# Patient Record
Sex: MALE | Race: WHITE | Employment: UNEMPLOYED | URBAN - NONMETROPOLITAN AREA
[De-identification: names, ages, dates, MRNs, and addresses within clinical notes are randomized per-mention and may not be internally consistent; named-entity substitution may affect disease eponyms.]

---

## 2017-11-21 ENCOUNTER — HOSPITAL ENCOUNTER (OUTPATIENT)
Dept: LAB | Age: 57
Discharge: HOME | End: 2017-11-21
Attending: PSYCHIATRY & NEUROLOGY | Admitting: PSYCHIATRY & NEUROLOGY

## 2017-11-21 LAB
ALBUMIN SERPL-MCNC: 4.3 GM/DL (ref 3.4–5)
ALP BLD-CCNC: 61 IU/L (ref 40–129)
ALT SERPL-CCNC: 13 U/L (ref 10–40)
ANION GAP SERPL CALCULATED.3IONS-SCNC: 15 MMOL/L (ref 4–16)
AST SERPL-CCNC: 13 IU/L (ref 15–37)
BILIRUB SERPL-MCNC: 0.3 MG/DL (ref 0–1)
BUN BLDV-MCNC: 8 MG/DL (ref 6–23)
CALCIUM SERPL-MCNC: 9.2 MG/DL (ref 8.3–10.6)
CHLORIDE BLD-SCNC: 103 MMOL/L (ref 99–110)
CHOLESTEROL, FASTING: 249 MG/DL
CO2: 26 MMOL/L (ref 21–32)
CREAT SERPL-MCNC: 0.6 MG/DL (ref 0.9–1.3)
GFR AFRICAN AMERICAN: >60 ML/MIN/1.73M2
GFR NON-AFRICAN AMERICAN: >60 ML/MIN/1.73M2
GLUCOSE FASTING: 109 MG/DL (ref 70–99)
HCT VFR BLD CALC: 43.8 % (ref 42–52)
HDLC SERPL-MCNC: 65 MG/DL
HEMOGLOBIN: 14.8 GM/DL (ref 13.5–18)
LDL CHOLESTEROL DIRECT: 169 MG/DL
MCH RBC QN AUTO: 32.4 PG (ref 27–31)
MCHC RBC AUTO-ENTMCNC: 33.8 % (ref 32–36)
MCV RBC AUTO: 95.8 FL (ref 78–100)
PDW BLD-RTO: 11.9 % (ref 11.7–14.9)
PLATELET # BLD: 227 K/CU MM (ref 140–440)
PMV BLD AUTO: 10.1 FL (ref 7.5–11.1)
POTASSIUM SERPL-SCNC: 4.3 MMOL/L (ref 3.5–5.1)
RBC # BLD: 4.57 M/CU MM (ref 4.6–6.2)
SODIUM BLD-SCNC: 144 MMOL/L (ref 135–145)
TOTAL PROTEIN: 6.9 GM/DL (ref 6.4–8.2)
TRIGLYCERIDE, FASTING: 102 MG/DL
WBC # BLD: 8.7 K/CU MM (ref 4–10.5)

## 2019-03-18 ENCOUNTER — HOSPITAL ENCOUNTER (INPATIENT)
Age: 59
LOS: 2 days | Discharge: HOME | End: 2019-03-21
Attending: EMERGENCY MEDICINE | Admitting: PSYCHIATRY & NEUROLOGY
Payer: MEDICAID

## 2019-03-18 DIAGNOSIS — F10.10 ALCOHOL ABUSE: ICD-10-CM

## 2019-03-18 DIAGNOSIS — F30.10 MANIC BEHAVIOR (HCC): ICD-10-CM

## 2019-03-18 DIAGNOSIS — R45.851 SUICIDAL IDEATION: Primary | ICD-10-CM

## 2019-03-18 LAB
ACETAMINOPHEN LEVEL: <5 UG/ML (ref 15–30)
ALBUMIN SERPL-MCNC: 4.4 GM/DL (ref 3.4–5)
ALCOHOL SCREEN SERUM: 0.1 %WT/VOL
ALP BLD-CCNC: 63 IU/L (ref 40–129)
ALT SERPL-CCNC: 53 U/L (ref 10–40)
AMPHETAMINES: NEGATIVE
ANION GAP SERPL CALCULATED.3IONS-SCNC: 24 MMOL/L (ref 4–16)
AST SERPL-CCNC: 89 IU/L (ref 15–37)
BACTERIA: ABNORMAL /HPF
BARBITURATE SCREEN URINE: NEGATIVE
BASOPHILS ABSOLUTE: 0 K/CU MM
BASOPHILS RELATIVE PERCENT: 0.4 % (ref 0–1)
BENZODIAZEPINE SCREEN, URINE: NEGATIVE
BILIRUB SERPL-MCNC: 0.5 MG/DL (ref 0–1)
BILIRUBIN URINE: NEGATIVE MG/DL
BLOOD, URINE: NEGATIVE
BUN BLDV-MCNC: 5 MG/DL (ref 6–23)
CALCIUM SERPL-MCNC: 9.3 MG/DL (ref 8.3–10.6)
CANNABINOID SCREEN URINE: ABNORMAL
CAST TYPE: ABNORMAL /HPF
CHLORIDE BLD-SCNC: 99 MMOL/L (ref 99–110)
CLARITY: CLEAR
CO2: 20 MMOL/L (ref 21–32)
COCAINE METABOLITE: NEGATIVE
COLOR: YELLOW
CREAT SERPL-MCNC: 0.4 MG/DL (ref 0.9–1.3)
CRYSTAL TYPE: ABNORMAL /HPF
DIFFERENTIAL TYPE: ABNORMAL
DOSE AMOUNT: ABNORMAL
DOSE AMOUNT: ABNORMAL
DOSE TIME: ABNORMAL
DOSE TIME: ABNORMAL
EOSINOPHILS ABSOLUTE: 0.2 K/CU MM
EOSINOPHILS RELATIVE PERCENT: 1.3 % (ref 0–3)
EPITHELIAL CELLS, UA: ABNORMAL /HPF
GFR AFRICAN AMERICAN: >60 ML/MIN/1.73M2
GFR NON-AFRICAN AMERICAN: >60 ML/MIN/1.73M2
GLUCOSE BLD-MCNC: 82 MG/DL (ref 70–99)
GLUCOSE, URINE: NEGATIVE MG/DL
HCT VFR BLD CALC: 42.6 % (ref 42–52)
HEMOGLOBIN: 14.8 GM/DL (ref 13.5–18)
IMMATURE NEUTROPHIL %: 0.2 % (ref 0–0.43)
KETONES, URINE: NEGATIVE MG/DL
LEUKOCYTE ESTERASE, URINE: NEGATIVE
LYMPHOCYTES ABSOLUTE: 2.9 K/CU MM
LYMPHOCYTES RELATIVE PERCENT: 25.5 % (ref 24–44)
MCH RBC QN AUTO: 33.3 PG (ref 27–31)
MCHC RBC AUTO-ENTMCNC: 34.7 % (ref 32–36)
MCV RBC AUTO: 95.7 FL (ref 78–100)
MONOCYTES ABSOLUTE: 0.9 K/CU MM
MONOCYTES RELATIVE PERCENT: 7.9 % (ref 0–4)
MUCUS: NEGATIVE HPF
NITRITE URINE, QUANTITATIVE: NEGATIVE
OPIATES, URINE: NEGATIVE
OXYCODONE: ABNORMAL
PDW BLD-RTO: 12.4 % (ref 11.7–14.9)
PH, URINE: 7 (ref 5–8)
PHENCYCLIDINE, URINE: NEGATIVE
PLATELET # BLD: 224 K/CU MM (ref 140–440)
PMV BLD AUTO: 10.2 FL (ref 7.5–11.1)
POTASSIUM SERPL-SCNC: 3.7 MMOL/L (ref 3.5–5.1)
PROTEIN UA: NEGATIVE MG/DL
RBC # BLD: 4.45 M/CU MM (ref 4.6–6.2)
RBC URINE: ABNORMAL /HPF (ref 0–3)
SALICYLATE LEVEL: <0.3 MG/DL (ref 15–30)
SEGMENTED NEUTROPHILS ABSOLUTE COUNT: 7.3 K/CU MM
SEGMENTED NEUTROPHILS RELATIVE PERCENT: 64.7 % (ref 36–66)
SODIUM BLD-SCNC: 143 MMOL/L (ref 135–145)
SPECIFIC GRAVITY UA: 1 (ref 1–1.03)
TOTAL IMMATURE NEUTOROPHIL: 0.02 K/CU MM
TOTAL PROTEIN: 6.9 GM/DL (ref 6.4–8.2)
TSH HIGH SENSITIVITY: 1.58 UIU/ML (ref 0.27–4.2)
UROBILINOGEN, URINE: 0.2 MG/DL (ref 0.2–1)
WBC # BLD: 11.3 K/CU MM (ref 4–10.5)
WBC UA: ABNORMAL /HPF (ref 0–2)

## 2019-03-18 PROCEDURE — 81001 URINALYSIS AUTO W/SCOPE: CPT

## 2019-03-18 PROCEDURE — 80053 COMPREHEN METABOLIC PANEL: CPT

## 2019-03-18 PROCEDURE — G0480 DRUG TEST DEF 1-7 CLASSES: HCPCS

## 2019-03-18 PROCEDURE — 85025 COMPLETE CBC W/AUTO DIFF WBC: CPT

## 2019-03-18 PROCEDURE — 84443 ASSAY THYROID STIM HORMONE: CPT

## 2019-03-18 PROCEDURE — 99285 EMERGENCY DEPT VISIT HI MDM: CPT

## 2019-03-18 PROCEDURE — 80307 DRUG TEST PRSMV CHEM ANLYZR: CPT

## 2019-03-18 RX ORDER — 0.9 % SODIUM CHLORIDE 0.9 %
1000 INTRAVENOUS SOLUTION INTRAVENOUS ONCE
Status: DISCONTINUED | OUTPATIENT
Start: 2019-03-18 | End: 2019-03-18

## 2019-03-18 ASSESSMENT — PAIN DESCRIPTION - LOCATION: LOCATION: ANKLE

## 2019-03-18 ASSESSMENT — PAIN SCALES - GENERAL: PAINLEVEL_OUTOF10: 10

## 2019-03-19 PROBLEM — F31.2 SEVERE MANIC BIPOLAR I DISORDER WITH PSYCHOTIC FEATURES (HCC): Status: ACTIVE | Noted: 2019-03-19

## 2019-03-19 LAB — ALCOHOL SCREEN SERUM: NORMAL %WT/VOL

## 2019-03-19 PROCEDURE — 1240000000 HC EMOTIONAL WELLNESS R&B

## 2019-03-19 PROCEDURE — 6370000000 HC RX 637 (ALT 250 FOR IP): Performed by: EMERGENCY MEDICINE

## 2019-03-19 PROCEDURE — 6370000000 HC RX 637 (ALT 250 FOR IP): Performed by: PSYCHIATRY & NEUROLOGY

## 2019-03-19 PROCEDURE — G0480 DRUG TEST DEF 1-7 CLASSES: HCPCS

## 2019-03-19 PROCEDURE — G0378 HOSPITAL OBSERVATION PER HR: HCPCS

## 2019-03-19 PROCEDURE — 6370000000 HC RX 637 (ALT 250 FOR IP)

## 2019-03-19 RX ORDER — LORAZEPAM 1 MG/1
2 TABLET ORAL 3 TIMES DAILY
Status: COMPLETED | OUTPATIENT
Start: 2019-03-19 | End: 2019-03-19

## 2019-03-19 RX ORDER — LORAZEPAM 1 MG/1
1 TABLET ORAL EVERY 4 HOURS PRN
Status: DISCONTINUED | OUTPATIENT
Start: 2019-03-19 | End: 2019-03-21 | Stop reason: HOSPADM

## 2019-03-19 RX ORDER — OMEPRAZOLE 20 MG/1
20 CAPSULE, DELAYED RELEASE ORAL DAILY
Status: ON HOLD | COMMUNITY
End: 2019-03-21 | Stop reason: HOSPADM

## 2019-03-19 RX ORDER — NICOTINE 21 MG/24HR
1 PATCH, TRANSDERMAL 24 HOURS TRANSDERMAL DAILY
Status: DISCONTINUED | OUTPATIENT
Start: 2019-03-19 | End: 2019-03-20

## 2019-03-19 RX ORDER — THIAMINE MONONITRATE (VIT B1) 100 MG
100 TABLET ORAL DAILY
Status: DISCONTINUED | OUTPATIENT
Start: 2019-03-19 | End: 2019-03-21 | Stop reason: HOSPADM

## 2019-03-19 RX ORDER — LORAZEPAM 2 MG/ML
4 INJECTION INTRAMUSCULAR
Status: DISCONTINUED | OUTPATIENT
Start: 2019-03-19 | End: 2019-03-21 | Stop reason: HOSPADM

## 2019-03-19 RX ORDER — LORAZEPAM 1 MG/1
1 TABLET ORAL 3 TIMES DAILY
Status: ACTIVE | OUTPATIENT
Start: 2019-03-20 | End: 2019-03-21

## 2019-03-19 RX ORDER — FOLIC ACID 1 MG/1
1 TABLET ORAL DAILY
Status: DISCONTINUED | OUTPATIENT
Start: 2019-03-19 | End: 2019-03-21 | Stop reason: HOSPADM

## 2019-03-19 RX ORDER — LORAZEPAM 1 MG/1
3 TABLET ORAL
Status: DISCONTINUED | OUTPATIENT
Start: 2019-03-19 | End: 2019-03-21 | Stop reason: HOSPADM

## 2019-03-19 RX ORDER — LORAZEPAM 2 MG/ML
2 INJECTION INTRAMUSCULAR
Status: DISCONTINUED | OUTPATIENT
Start: 2019-03-19 | End: 2019-03-21 | Stop reason: HOSPADM

## 2019-03-19 RX ORDER — LORAZEPAM 2 MG/ML
3 INJECTION INTRAMUSCULAR
Status: DISCONTINUED | OUTPATIENT
Start: 2019-03-19 | End: 2019-03-21 | Stop reason: HOSPADM

## 2019-03-19 RX ORDER — DIVALPROEX SODIUM 500 MG/1
1000 TABLET, EXTENDED RELEASE ORAL NIGHTLY
Status: DISCONTINUED | OUTPATIENT
Start: 2019-03-19 | End: 2019-03-21 | Stop reason: HOSPADM

## 2019-03-19 RX ORDER — ONDANSETRON 2 MG/ML
4 INJECTION INTRAMUSCULAR; INTRAVENOUS EVERY 6 HOURS PRN
Status: DISCONTINUED | OUTPATIENT
Start: 2019-03-19 | End: 2019-03-21 | Stop reason: HOSPADM

## 2019-03-19 RX ORDER — LORAZEPAM 1 MG/1
4 TABLET ORAL
Status: DISCONTINUED | OUTPATIENT
Start: 2019-03-19 | End: 2019-03-21 | Stop reason: HOSPADM

## 2019-03-19 RX ORDER — TRAZODONE HYDROCHLORIDE 50 MG/1
50 TABLET ORAL NIGHTLY PRN
Status: DISCONTINUED | OUTPATIENT
Start: 2019-03-19 | End: 2019-03-21 | Stop reason: HOSPADM

## 2019-03-19 RX ORDER — LORAZEPAM 0.5 MG/1
0.5 TABLET ORAL 2 TIMES DAILY
Status: DISCONTINUED | OUTPATIENT
Start: 2019-03-22 | End: 2019-03-21 | Stop reason: HOSPADM

## 2019-03-19 RX ORDER — SODIUM CHLORIDE FOR INHALATION 0.9 %
VIAL, NEBULIZER (ML) INHALATION
Status: COMPLETED
Start: 2019-03-19 | End: 2019-03-19

## 2019-03-19 RX ORDER — LORAZEPAM 1 MG/1
1 TABLET ORAL
Status: DISCONTINUED | OUTPATIENT
Start: 2019-03-19 | End: 2019-03-21 | Stop reason: HOSPADM

## 2019-03-19 RX ORDER — LORAZEPAM 0.5 MG/1
0.5 TABLET ORAL 2 TIMES DAILY
Status: DISCONTINUED | OUTPATIENT
Start: 2019-03-21 | End: 2019-03-19 | Stop reason: DRUGHIGH

## 2019-03-19 RX ORDER — RISPERIDONE 2 MG/1
2 TABLET, FILM COATED ORAL NIGHTLY
Status: DISCONTINUED | OUTPATIENT
Start: 2019-03-19 | End: 2019-03-21 | Stop reason: HOSPADM

## 2019-03-19 RX ORDER — LORAZEPAM 1 MG/1
2 TABLET ORAL
Status: DISCONTINUED | OUTPATIENT
Start: 2019-03-19 | End: 2019-03-21 | Stop reason: HOSPADM

## 2019-03-19 RX ORDER — ZIPRASIDONE HYDROCHLORIDE 20 MG/1
20 CAPSULE ORAL ONCE
Status: COMPLETED | OUTPATIENT
Start: 2019-03-19 | End: 2019-03-19

## 2019-03-19 RX ORDER — FAMOTIDINE 20 MG/1
20 TABLET, FILM COATED ORAL 2 TIMES DAILY
Status: DISCONTINUED | OUTPATIENT
Start: 2019-03-19 | End: 2019-03-21 | Stop reason: HOSPADM

## 2019-03-19 RX ORDER — CALCIUM CARBONATE 200(500)MG
500 TABLET,CHEWABLE ORAL ONCE
Status: COMPLETED | OUTPATIENT
Start: 2019-03-19 | End: 2019-03-19

## 2019-03-19 RX ORDER — LORAZEPAM 2 MG/ML
1 INJECTION INTRAMUSCULAR
Status: DISCONTINUED | OUTPATIENT
Start: 2019-03-19 | End: 2019-03-21 | Stop reason: HOSPADM

## 2019-03-19 RX ORDER — ACETAMINOPHEN 325 MG/1
650 TABLET ORAL EVERY 4 HOURS PRN
Status: DISCONTINUED | OUTPATIENT
Start: 2019-03-19 | End: 2019-03-21 | Stop reason: HOSPADM

## 2019-03-19 RX ORDER — NICOTINE 21 MG/24HR
1 PATCH, TRANSDERMAL 24 HOURS TRANSDERMAL DAILY
Status: DISCONTINUED | OUTPATIENT
Start: 2019-03-19 | End: 2019-03-19

## 2019-03-19 RX ORDER — HALOPERIDOL 5 MG/ML
5 INJECTION INTRAMUSCULAR EVERY 4 HOURS PRN
Status: DISCONTINUED | OUTPATIENT
Start: 2019-03-19 | End: 2019-03-21 | Stop reason: HOSPADM

## 2019-03-19 RX ORDER — LORAZEPAM 0.5 MG/1
0.5 TABLET ORAL 3 TIMES DAILY
Status: DISCONTINUED | OUTPATIENT
Start: 2019-03-21 | End: 2019-03-21 | Stop reason: HOSPADM

## 2019-03-19 RX ORDER — NICOTINE 21 MG/24HR
1 PATCH, TRANSDERMAL 24 HOURS TRANSDERMAL DAILY
Status: DISCONTINUED | OUTPATIENT
Start: 2019-03-19 | End: 2019-03-21 | Stop reason: HOSPADM

## 2019-03-19 RX ADMIN — FOLIC ACID 1 MG: 1 TABLET ORAL at 15:25

## 2019-03-19 RX ADMIN — RISPERIDONE 2 MG: 2 TABLET ORAL at 21:47

## 2019-03-19 RX ADMIN — CALCIUM CARBONATE 500 MG: 500 TABLET, CHEWABLE ORAL at 02:29

## 2019-03-19 RX ADMIN — Medication 100 MG: at 15:25

## 2019-03-19 RX ADMIN — ZIPRASIDONE HYDROCHLORIDE 20 MG: 20 CAPSULE ORAL at 02:29

## 2019-03-19 RX ADMIN — DIVALPROEX SODIUM 1000 MG: 500 TABLET, EXTENDED RELEASE ORAL at 21:47

## 2019-03-19 RX ADMIN — Medication: at 16:29

## 2019-03-19 RX ADMIN — FAMOTIDINE 20 MG: 20 TABLET, FILM COATED ORAL at 21:47

## 2019-03-19 RX ADMIN — LORAZEPAM 2 MG: 1 TABLET ORAL at 21:47

## 2019-03-19 RX ADMIN — LORAZEPAM 2 MG: 1 TABLET ORAL at 14:22

## 2019-03-19 ASSESSMENT — SLEEP AND FATIGUE QUESTIONNAIRES
DO YOU HAVE DIFFICULTY SLEEPING: NO
DO YOU USE A SLEEP AID: NO

## 2019-03-19 ASSESSMENT — LIFESTYLE VARIABLES: HISTORY_ALCOHOL_USE: YES

## 2019-03-19 ASSESSMENT — PAIN SCALES - GENERAL: PAINLEVEL_OUTOF10: 0

## 2019-03-20 PROBLEM — R74.01 TRANSAMINITIS: Status: ACTIVE | Noted: 2019-03-20

## 2019-03-20 PROBLEM — F10.288 ALCOHOL DEPENDENCE WITH OTHER ALCOHOL-INDUCED DISORDER (HCC): Chronic | Status: ACTIVE | Noted: 2019-03-20

## 2019-03-20 PROBLEM — F19.10 POLYSUBSTANCE ABUSE (HCC): Chronic | Status: ACTIVE | Noted: 2019-03-20

## 2019-03-20 LAB
ALBUMIN SERPL-MCNC: 4.4 GM/DL (ref 3.4–5)
ALP BLD-CCNC: 69 IU/L (ref 40–129)
ALT SERPL-CCNC: 48 U/L (ref 10–40)
ANION GAP SERPL CALCULATED.3IONS-SCNC: 11 MMOL/L (ref 4–16)
AST SERPL-CCNC: 59 IU/L (ref 15–37)
BILIRUB SERPL-MCNC: 0.6 MG/DL (ref 0–1)
BILIRUBIN DIRECT: 0.2 MG/DL (ref 0–0.3)
BILIRUBIN, INDIRECT: 0.4 MG/DL (ref 0–0.7)
BUN BLDV-MCNC: 11 MG/DL (ref 6–23)
CALCIUM SERPL-MCNC: 9.9 MG/DL (ref 8.3–10.6)
CHLORIDE BLD-SCNC: 102 MMOL/L (ref 99–110)
CO2: 31 MMOL/L (ref 21–32)
CREAT SERPL-MCNC: 0.7 MG/DL (ref 0.9–1.3)
FERRITIN: 242 NG/ML (ref 30–400)
GFR AFRICAN AMERICAN: >60 ML/MIN/1.73M2
GFR NON-AFRICAN AMERICAN: >60 ML/MIN/1.73M2
GLUCOSE BLD-MCNC: 116 MG/DL (ref 70–99)
HBV SURFACE AB TITR SER: <3.5 {TITER}
HCT VFR BLD CALC: 46.8 % (ref 42–52)
HEMOGLOBIN: 15.3 GM/DL (ref 13.5–18)
HEPATITIS B SURFACE ANTIGEN: NON REACTIVE
MCH RBC QN AUTO: 32.7 PG (ref 27–31)
MCHC RBC AUTO-ENTMCNC: 32.7 % (ref 32–36)
MCV RBC AUTO: 100 FL (ref 78–100)
PDW BLD-RTO: 12.8 % (ref 11.7–14.9)
PLATELET # BLD: 223 K/CU MM (ref 140–440)
PMV BLD AUTO: 10.6 FL (ref 7.5–11.1)
POTASSIUM SERPL-SCNC: 4.4 MMOL/L (ref 3.5–5.1)
RBC # BLD: 4.68 M/CU MM (ref 4.6–6.2)
SODIUM BLD-SCNC: 144 MMOL/L (ref 135–145)
TOTAL PROTEIN: 7 GM/DL (ref 6.4–8.2)
WBC # BLD: 9.2 K/CU MM (ref 4–10.5)

## 2019-03-20 PROCEDURE — 1240000000 HC EMOTIONAL WELLNESS R&B

## 2019-03-20 PROCEDURE — 86708 HEPATITIS A ANTIBODY: CPT

## 2019-03-20 PROCEDURE — 82248 BILIRUBIN DIRECT: CPT

## 2019-03-20 PROCEDURE — G0378 HOSPITAL OBSERVATION PER HR: HCPCS

## 2019-03-20 PROCEDURE — 6370000000 HC RX 637 (ALT 250 FOR IP): Performed by: PSYCHIATRY & NEUROLOGY

## 2019-03-20 PROCEDURE — 82390 ASSAY OF CERULOPLASMIN: CPT

## 2019-03-20 PROCEDURE — 86038 ANTINUCLEAR ANTIBODIES: CPT

## 2019-03-20 PROCEDURE — 82728 ASSAY OF FERRITIN: CPT

## 2019-03-20 PROCEDURE — 80053 COMPREHEN METABOLIC PANEL: CPT

## 2019-03-20 PROCEDURE — 86706 HEP B SURFACE ANTIBODY: CPT

## 2019-03-20 PROCEDURE — 85027 COMPLETE CBC AUTOMATED: CPT

## 2019-03-20 PROCEDURE — 36415 COLL VENOUS BLD VENIPUNCTURE: CPT

## 2019-03-20 PROCEDURE — 86803 HEPATITIS C AB TEST: CPT

## 2019-03-20 PROCEDURE — 87340 HEPATITIS B SURFACE AG IA: CPT

## 2019-03-20 RX ADMIN — TRAZODONE HYDROCHLORIDE 50 MG: 50 TABLET ORAL at 20:09

## 2019-03-20 RX ADMIN — FAMOTIDINE 20 MG: 20 TABLET, FILM COATED ORAL at 09:18

## 2019-03-20 RX ADMIN — FAMOTIDINE 20 MG: 20 TABLET, FILM COATED ORAL at 20:09

## 2019-03-20 RX ADMIN — Medication 100 MG: at 09:18

## 2019-03-20 RX ADMIN — LORAZEPAM 1 MG: 1 TABLET ORAL at 20:10

## 2019-03-20 RX ADMIN — DIVALPROEX SODIUM 1000 MG: 500 TABLET, EXTENDED RELEASE ORAL at 20:09

## 2019-03-20 RX ADMIN — LORAZEPAM 1 MG: 1 TABLET ORAL at 09:18

## 2019-03-20 RX ADMIN — FOLIC ACID 1 MG: 1 TABLET ORAL at 09:18

## 2019-03-20 RX ADMIN — RISPERIDONE 2 MG: 2 TABLET ORAL at 20:09

## 2019-03-20 ASSESSMENT — PAIN SCALES - GENERAL: PAINLEVEL_OUTOF10: 0

## 2019-03-21 ENCOUNTER — APPOINTMENT (OUTPATIENT)
Dept: ULTRASOUND IMAGING | Age: 59
End: 2019-03-21
Payer: MEDICAID

## 2019-03-21 ENCOUNTER — APPOINTMENT (OUTPATIENT)
Dept: CT IMAGING | Age: 59
End: 2019-03-21
Payer: MEDICAID

## 2019-03-21 VITALS
DIASTOLIC BLOOD PRESSURE: 76 MMHG | HEIGHT: 71 IN | WEIGHT: 166 LBS | BODY MASS INDEX: 23.24 KG/M2 | RESPIRATION RATE: 16 BRPM | TEMPERATURE: 98.2 F | HEART RATE: 114 BPM | OXYGEN SATURATION: 99 % | SYSTOLIC BLOOD PRESSURE: 107 MMHG

## 2019-03-21 PROCEDURE — G0378 HOSPITAL OBSERVATION PER HR: HCPCS

## 2019-03-21 PROCEDURE — 6360000002 HC RX W HCPCS: Performed by: PSYCHIATRY & NEUROLOGY

## 2019-03-21 PROCEDURE — 74177 CT ABD & PELVIS W/CONTRAST: CPT

## 2019-03-21 PROCEDURE — 76705 ECHO EXAM OF ABDOMEN: CPT

## 2019-03-21 PROCEDURE — 96372 THER/PROPH/DIAG INJ SC/IM: CPT

## 2019-03-21 PROCEDURE — 6360000004 HC RX CONTRAST MEDICATION: Performed by: PSYCHIATRY & NEUROLOGY

## 2019-03-21 PROCEDURE — 6370000000 HC RX 637 (ALT 250 FOR IP): Performed by: PSYCHIATRY & NEUROLOGY

## 2019-03-21 RX ORDER — TRAZODONE HYDROCHLORIDE 50 MG/1
50 TABLET ORAL NIGHTLY PRN
Qty: 30 TABLET | Refills: 0 | Status: SHIPPED | OUTPATIENT
Start: 2019-03-21

## 2019-03-21 RX ORDER — NICOTINE 21 MG/24HR
1 PATCH, TRANSDERMAL 24 HOURS TRANSDERMAL DAILY
Qty: 30 PATCH | Refills: 0 | Status: SHIPPED | OUTPATIENT
Start: 2019-03-22

## 2019-03-21 RX ORDER — LANOLIN ALCOHOL/MO/W.PET/CERES
100 CREAM (GRAM) TOPICAL DAILY
Qty: 30 TABLET | Refills: 0 | Status: SHIPPED | OUTPATIENT
Start: 2019-03-22

## 2019-03-21 RX ORDER — DIVALPROEX SODIUM 500 MG/1
1000 TABLET, EXTENDED RELEASE ORAL NIGHTLY
Qty: 60 TABLET | Refills: 0 | Status: SHIPPED | OUTPATIENT
Start: 2019-03-21

## 2019-03-21 RX ORDER — RISPERIDONE 2 MG/1
2 TABLET, FILM COATED ORAL NIGHTLY
Qty: 30 TABLET | Refills: 0 | Status: SHIPPED | OUTPATIENT
Start: 2019-03-21

## 2019-03-21 RX ORDER — FOLIC ACID 1 MG/1
1 TABLET ORAL DAILY
Qty: 30 TABLET | Refills: 0 | Status: SHIPPED | OUTPATIENT
Start: 2019-03-22

## 2019-03-21 RX ORDER — FAMOTIDINE 20 MG/1
20 TABLET, FILM COATED ORAL 2 TIMES DAILY
Qty: 60 TABLET | Refills: 0 | Status: SHIPPED | OUTPATIENT
Start: 2019-03-21

## 2019-03-21 RX ADMIN — FAMOTIDINE 20 MG: 20 TABLET, FILM COATED ORAL at 08:10

## 2019-03-21 RX ADMIN — PALIPERIDONE PALMITATE 156 MG: 156 INJECTION INTRAMUSCULAR at 14:46

## 2019-03-21 RX ADMIN — IOPAMIDOL 75 ML: 755 INJECTION, SOLUTION INTRAVENOUS at 12:28

## 2019-03-21 RX ADMIN — Medication 100 MG: at 08:10

## 2019-03-21 RX ADMIN — FOLIC ACID 1 MG: 1 TABLET ORAL at 08:10

## 2019-03-21 RX ADMIN — LORAZEPAM 1 MG: 1 TABLET ORAL at 08:10

## 2019-03-21 RX ADMIN — LORAZEPAM 0.5 MG: 0.5 TABLET ORAL at 14:46

## 2019-03-23 LAB
CERULOPLASMIN: 23 MG/DL (ref 17–54)
CERULOPLASMIN: NORMAL MG/DL (ref 17–54)

## 2019-03-24 LAB
ANTI-NUCLEAR ANTIBODY (ANA): NORMAL
ANTI-NUCLEAR ANTIBODY (ANA): NORMAL
HAV AB SERPL IA-ACNC: NEGATIVE
HAV AB SERPL IA-ACNC: NORMAL
HEPATITIS C ANTIBODY INTERPRETATION: NEGATIVE
HEPATITIS C ANTIBODY INTERPRETATION: NORMAL
HEPATITIS C ANTIBODY: 0.09 IV
HEPATITIS C ANTIBODY: NORMAL IV

## 2019-05-09 ENCOUNTER — HOSPITAL ENCOUNTER (OUTPATIENT)
Age: 59
Discharge: HOME | End: 2019-05-09
Payer: MEDICAID

## 2019-05-09 LAB
ALT SERPL-CCNC: 21 U/L (ref 10–40)
AST SERPL-CCNC: 16 IU/L (ref 15–37)
DOSE AMOUNT: ABNORMAL
DOSE TIME: ABNORMAL
GAMMA GLUTAMYL TRANSFERASE: 11 IU/L (ref 8–61)
VALPROIC ACID LEVEL: 49.7 UG/ML (ref 50–100)

## 2019-05-09 PROCEDURE — 84450 TRANSFERASE (AST) (SGOT): CPT

## 2019-05-09 PROCEDURE — 82977 ASSAY OF GGT: CPT

## 2019-05-09 PROCEDURE — 36415 COLL VENOUS BLD VENIPUNCTURE: CPT

## 2019-05-09 PROCEDURE — 80164 ASSAY DIPROPYLACETIC ACD TOT: CPT

## 2019-05-09 PROCEDURE — 84460 ALANINE AMINO (ALT) (SGPT): CPT

## 2019-06-04 ENCOUNTER — HOSPITAL ENCOUNTER (OUTPATIENT)
Age: 59
Discharge: HOME | End: 2019-06-04
Payer: MEDICAID

## 2019-06-04 PROCEDURE — 36415 COLL VENOUS BLD VENIPUNCTURE: CPT

## 2019-06-04 PROCEDURE — 80164 ASSAY DIPROPYLACETIC ACD TOT: CPT

## 2019-06-05 LAB
DOSE AMOUNT: NORMAL
DOSE TIME: NORMAL
VALPROIC ACID LEVEL: 60 UG/ML (ref 50–100)

## 2019-09-25 ENCOUNTER — HOSPITAL ENCOUNTER (OUTPATIENT)
Age: 59
Discharge: HOME | End: 2019-09-25
Payer: MEDICAID

## 2019-09-25 LAB
ALBUMIN SERPL-MCNC: 4.1 GM/DL (ref 3.4–5)
ALBUMIN SERPL-MCNC: 4.1 GM/DL (ref 3.4–5)
ALP BLD-CCNC: 60 IU/L (ref 40–129)
ALP BLD-CCNC: 60 IU/L (ref 40–129)
ALT SERPL-CCNC: 20 U/L (ref 10–40)
ALT SERPL-CCNC: 20 U/L (ref 10–40)
ANION GAP SERPL CALCULATED.3IONS-SCNC: 8 MMOL/L (ref 4–16)
AST SERPL-CCNC: 18 IU/L (ref 15–37)
AST SERPL-CCNC: 18 IU/L (ref 15–37)
BILIRUB SERPL-MCNC: 0.3 MG/DL (ref 0–1)
BILIRUB SERPL-MCNC: 0.3 MG/DL (ref 0–1)
BILIRUBIN DIRECT: 0.2 MG/DL (ref 0–0.3)
BILIRUBIN, INDIRECT: 0.1 MG/DL (ref 0–0.7)
BUN BLDV-MCNC: 13 MG/DL (ref 6–23)
CALCIUM SERPL-MCNC: 9.3 MG/DL (ref 8.3–10.6)
CHLORIDE BLD-SCNC: 104 MMOL/L (ref 99–110)
CHOLESTEROL, FASTING: 228 MG/DL
CO2: 31 MMOL/L (ref 21–32)
CREAT SERPL-MCNC: 0.6 MG/DL (ref 0.9–1.3)
DOSE AMOUNT: NORMAL
DOSE TIME: NORMAL
ESTIMATED AVERAGE GLUCOSE: 105 MG/DL
GFR AFRICAN AMERICAN: >60 ML/MIN/1.73M2
GFR NON-AFRICAN AMERICAN: >60 ML/MIN/1.73M2
GLUCOSE FASTING: 100 MG/DL (ref 70–99)
HBA1C MFR BLD: 5.3 % (ref 4.2–6.3)
HCT VFR BLD CALC: 42.6 % (ref 42–52)
HDLC SERPL-MCNC: 44 MG/DL
HEMOGLOBIN: 14.3 GM/DL (ref 13.5–18)
LDL CHOLESTEROL DIRECT: 171 MG/DL
MCH RBC QN AUTO: 32.6 PG (ref 27–31)
MCHC RBC AUTO-ENTMCNC: 33.6 % (ref 32–36)
MCV RBC AUTO: 97 FL (ref 78–100)
PDW BLD-RTO: 12.4 % (ref 11.7–14.9)
PLATELET # BLD: 188 K/CU MM (ref 140–440)
PMV BLD AUTO: 10.4 FL (ref 7.5–11.1)
POTASSIUM SERPL-SCNC: 4.4 MMOL/L (ref 3.5–5.1)
RBC # BLD: 4.39 M/CU MM (ref 4.6–6.2)
SODIUM BLD-SCNC: 143 MMOL/L (ref 135–145)
TOTAL PROTEIN: 6.6 GM/DL (ref 6.4–8.2)
TOTAL PROTEIN: 6.6 GM/DL (ref 6.4–8.2)
TRIGLYCERIDE, FASTING: 76 MG/DL
VALPROIC ACID LEVEL: 87.1 UG/ML (ref 50–100)
WBC # BLD: 7.8 K/CU MM (ref 4–10.5)

## 2019-09-25 PROCEDURE — 80061 LIPID PANEL: CPT

## 2019-09-25 PROCEDURE — 80053 COMPREHEN METABOLIC PANEL: CPT

## 2019-09-25 PROCEDURE — 80164 ASSAY DIPROPYLACETIC ACD TOT: CPT

## 2019-09-25 PROCEDURE — 36415 COLL VENOUS BLD VENIPUNCTURE: CPT

## 2019-09-25 PROCEDURE — 82248 BILIRUBIN DIRECT: CPT

## 2019-09-25 PROCEDURE — 83036 HEMOGLOBIN GLYCOSYLATED A1C: CPT

## 2019-09-25 PROCEDURE — 85027 COMPLETE CBC AUTOMATED: CPT

## 2020-07-31 ENCOUNTER — HOSPITAL ENCOUNTER (OUTPATIENT)
Age: 60
Discharge: HOME | End: 2020-07-31
Payer: MEDICAID

## 2020-07-31 LAB
ALBUMIN SERPL-MCNC: 4.1 GM/DL (ref 3.4–5)
ALP BLD-CCNC: 57 IU/L (ref 40–129)
ALT SERPL-CCNC: 5 U/L (ref 10–40)
ANION GAP SERPL CALCULATED.3IONS-SCNC: 11 MMOL/L (ref 4–16)
AST SERPL-CCNC: 13 IU/L (ref 15–37)
BILIRUB SERPL-MCNC: 0.4 MG/DL (ref 0–1)
BUN BLDV-MCNC: 11 MG/DL (ref 6–23)
CALCIUM SERPL-MCNC: 8.9 MG/DL (ref 8.3–10.6)
CHLORIDE BLD-SCNC: 106 MMOL/L (ref 99–110)
CO2: 27 MMOL/L (ref 21–32)
CREAT SERPL-MCNC: 0.6 MG/DL (ref 0.9–1.3)
DOSE AMOUNT: ABNORMAL
DOSE TIME: ABNORMAL
FOLATE: 10.6 NG/ML (ref 3.1–17.5)
GAMMA GLUTAMYL TRANSFERASE: 23 IU/L (ref 8–61)
GFR AFRICAN AMERICAN: >60 ML/MIN/1.73M2
GFR NON-AFRICAN AMERICAN: >60 ML/MIN/1.73M2
GLUCOSE FASTING: 91 MG/DL (ref 70–99)
POTASSIUM SERPL-SCNC: 4.2 MMOL/L (ref 3.5–5.1)
SODIUM BLD-SCNC: 144 MMOL/L (ref 135–145)
TOTAL PROTEIN: 6.3 GM/DL (ref 6.4–8.2)
VALPROIC ACID LEVEL: 100.8 UG/ML (ref 50–100)
VITAMIN B-12: 889.6 PG/ML (ref 211–911)

## 2020-07-31 PROCEDURE — 82977 ASSAY OF GGT: CPT

## 2020-07-31 PROCEDURE — 80053 COMPREHEN METABOLIC PANEL: CPT

## 2020-07-31 PROCEDURE — 36415 COLL VENOUS BLD VENIPUNCTURE: CPT

## 2020-07-31 PROCEDURE — 82746 ASSAY OF FOLIC ACID SERUM: CPT

## 2020-07-31 PROCEDURE — 80164 ASSAY DIPROPYLACETIC ACD TOT: CPT

## 2020-07-31 PROCEDURE — 82607 VITAMIN B-12: CPT

## 2023-10-31 ENCOUNTER — HOSPITAL ENCOUNTER (EMERGENCY)
Age: 63
Discharge: PSYCHIATRIC OR SUBSTANCE USE FACILITY | End: 2023-11-01
Attending: EMERGENCY MEDICINE
Payer: MEDICAID

## 2023-10-31 VITALS
HEART RATE: 105 BPM | SYSTOLIC BLOOD PRESSURE: 94 MMHG | RESPIRATION RATE: 16 BRPM | DIASTOLIC BLOOD PRESSURE: 62 MMHG | TEMPERATURE: 98.2 F | WEIGHT: 137.6 LBS | OXYGEN SATURATION: 93 % | HEIGHT: 71 IN | BODY MASS INDEX: 19.26 KG/M2

## 2023-10-31 DIAGNOSIS — F29 PSYCHOSIS, UNSPECIFIED PSYCHOSIS TYPE (HCC): Primary | ICD-10-CM

## 2023-10-31 LAB
ALBUMIN SERPL-MCNC: 4.4 GM/DL (ref 3.4–5)
ALCOHOL SCREEN SERUM: 0.03 %WT/VOL
ALP BLD-CCNC: 77 IU/L (ref 40–129)
ALT SERPL-CCNC: 34 U/L (ref 10–40)
AMPHETAMINES: NEGATIVE
ANION GAP SERPL CALCULATED.3IONS-SCNC: 19 MMOL/L (ref 4–16)
AST SERPL-CCNC: 61 IU/L (ref 15–37)
BARBITURATE SCREEN URINE: NEGATIVE
BASOPHILS ABSOLUTE: 0.1 K/CU MM
BASOPHILS RELATIVE PERCENT: 0.3 % (ref 0–1)
BENZODIAZEPINE SCREEN, URINE: NEGATIVE
BILIRUB SERPL-MCNC: 0.6 MG/DL (ref 0–1)
BILIRUBIN URINE: NEGATIVE MG/DL
BLOOD, URINE: NEGATIVE
BUN SERPL-MCNC: 7 MG/DL (ref 6–23)
CALCIUM SERPL-MCNC: 9.1 MG/DL (ref 8.3–10.6)
CANNABINOID SCREEN URINE: ABNORMAL
CHLORIDE BLD-SCNC: 100 MMOL/L (ref 99–110)
CLARITY: CLEAR
CO2: 22 MMOL/L (ref 21–32)
COCAINE METABOLITE: NEGATIVE
COLOR: YELLOW
COMMENT UA: ABNORMAL
CREAT SERPL-MCNC: 0.3 MG/DL (ref 0.9–1.3)
DIFFERENTIAL TYPE: ABNORMAL
EOSINOPHILS ABSOLUTE: 0 K/CU MM
EOSINOPHILS RELATIVE PERCENT: 0.1 % (ref 0–3)
FENTANYL URINE: NEGATIVE
GFR SERPL CREATININE-BSD FRML MDRD: >60 ML/MIN/1.73M2
GLUCOSE SERPL-MCNC: 84 MG/DL (ref 70–99)
GLUCOSE, URINE: NEGATIVE MG/DL
HCT VFR BLD CALC: 47.2 % (ref 42–52)
HEMOGLOBIN: 16.2 GM/DL (ref 13.5–18)
IMMATURE NEUTROPHIL %: 0.3 % (ref 0–0.43)
INR BLD: 0.9 INDEX
KETONES, URINE: 15 MG/DL
LEUKOCYTE ESTERASE, URINE: NEGATIVE
LYMPHOCYTES ABSOLUTE: 2.7 K/CU MM
LYMPHOCYTES RELATIVE PERCENT: 15.2 % (ref 24–44)
MCH RBC QN AUTO: 33.3 PG (ref 27–31)
MCHC RBC AUTO-ENTMCNC: 34.3 % (ref 32–36)
MCV RBC AUTO: 96.9 FL (ref 78–100)
MONOCYTES ABSOLUTE: 0.9 K/CU MM
MONOCYTES RELATIVE PERCENT: 5.3 % (ref 0–4)
NITRITE URINE, QUANTITATIVE: NEGATIVE
OPIATES, URINE: NEGATIVE
OXYCODONE: NEGATIVE
PDW BLD-RTO: 12 % (ref 11.7–14.9)
PH, URINE: 6 (ref 5–8)
PLATELET # BLD: 304 K/CU MM (ref 140–440)
PMV BLD AUTO: 9.5 FL (ref 7.5–11.1)
POTASSIUM SERPL-SCNC: 4.3 MMOL/L (ref 3.5–5.1)
PROTEIN UA: NEGATIVE MG/DL
PROTHROMBIN TIME: 12.6 SECONDS (ref 11.7–14.5)
RBC # BLD: 4.87 M/CU MM (ref 4.6–6.2)
SARS-COV-2 RDRP RESP QL NAA+PROBE: NOT DETECTED
SEGMENTED NEUTROPHILS ABSOLUTE COUNT: 13.7 K/CU MM
SEGMENTED NEUTROPHILS RELATIVE PERCENT: 78.8 % (ref 36–66)
SODIUM BLD-SCNC: 141 MMOL/L (ref 135–145)
SOURCE: NORMAL
SPECIFIC GRAVITY UA: 1.01 (ref 1–1.03)
TOTAL IMMATURE NEUTOROPHIL: 0.06 K/CU MM
TOTAL PROTEIN: 7.1 GM/DL (ref 6.4–8.2)
UROBILINOGEN, URINE: 0.2 MG/DL (ref 0.2–1)
WBC # BLD: 17.4 K/CU MM (ref 4–10.5)

## 2023-10-31 PROCEDURE — 99285 EMERGENCY DEPT VISIT HI MDM: CPT

## 2023-10-31 PROCEDURE — 85025 COMPLETE CBC W/AUTO DIFF WBC: CPT

## 2023-10-31 PROCEDURE — 87635 SARS-COV-2 COVID-19 AMP PRB: CPT

## 2023-10-31 PROCEDURE — 90792 PSYCH DIAG EVAL W/MED SRVCS: CPT | Performed by: PSYCHIATRY & NEUROLOGY

## 2023-10-31 PROCEDURE — 81003 URINALYSIS AUTO W/O SCOPE: CPT

## 2023-10-31 PROCEDURE — 96374 THER/PROPH/DIAG INJ IV PUSH: CPT

## 2023-10-31 PROCEDURE — 80053 COMPREHEN METABOLIC PANEL: CPT

## 2023-10-31 PROCEDURE — G0480 DRUG TEST DEF 1-7 CLASSES: HCPCS

## 2023-10-31 PROCEDURE — 80307 DRUG TEST PRSMV CHEM ANLYZR: CPT

## 2023-10-31 PROCEDURE — 96372 THER/PROPH/DIAG INJ SC/IM: CPT

## 2023-10-31 PROCEDURE — 85610 PROTHROMBIN TIME: CPT

## 2023-10-31 PROCEDURE — 6360000002 HC RX W HCPCS: Performed by: EMERGENCY MEDICINE

## 2023-10-31 RX ORDER — HALOPERIDOL 5 MG/ML
10 INJECTION INTRAMUSCULAR ONCE
Status: COMPLETED | OUTPATIENT
Start: 2023-10-31 | End: 2023-10-31

## 2023-10-31 RX ORDER — DIPHENHYDRAMINE HYDROCHLORIDE 50 MG/ML
50 INJECTION INTRAMUSCULAR; INTRAVENOUS ONCE
Status: COMPLETED | OUTPATIENT
Start: 2023-10-31 | End: 2023-10-31

## 2023-10-31 RX ORDER — LORAZEPAM 2 MG/ML
2 INJECTION INTRAMUSCULAR ONCE
Status: COMPLETED | OUTPATIENT
Start: 2023-10-31 | End: 2023-10-31

## 2023-10-31 RX ADMIN — DIPHENHYDRAMINE HYDROCHLORIDE 50 MG: 50 INJECTION INTRAMUSCULAR; INTRAVENOUS at 13:29

## 2023-10-31 RX ADMIN — HALOPERIDOL LACTATE 10 MG: 5 INJECTION, SOLUTION INTRAMUSCULAR at 13:32

## 2023-10-31 RX ADMIN — LORAZEPAM 2 MG: 2 INJECTION INTRAMUSCULAR; INTRAVENOUS at 13:34

## 2023-10-31 ASSESSMENT — PATIENT HEALTH QUESTIONNAIRE - PHQ9
2. FEELING DOWN, DEPRESSED OR HOPELESS: 0
SUM OF ALL RESPONSES TO PHQ QUESTIONS 1-9: 0
SUM OF ALL RESPONSES TO PHQ QUESTIONS 1-9: 0
1. LITTLE INTEREST OR PLEASURE IN DOING THINGS: 0
SUM OF ALL RESPONSES TO PHQ QUESTIONS 1-9: 0
SUM OF ALL RESPONSES TO PHQ9 QUESTIONS 1 & 2: 0
SUM OF ALL RESPONSES TO PHQ QUESTIONS 1-9: 0

## 2023-10-31 ASSESSMENT — LIFESTYLE VARIABLES
HOW MANY STANDARD DRINKS CONTAINING ALCOHOL DO YOU HAVE ON A TYPICAL DAY: 5 OR 6
HOW OFTEN DO YOU HAVE A DRINK CONTAINING ALCOHOL: 4 OR MORE TIMES A WEEK

## 2023-10-31 ASSESSMENT — PAIN - FUNCTIONAL ASSESSMENT: PAIN_FUNCTIONAL_ASSESSMENT: NONE - DENIES PAIN

## 2023-10-31 NOTE — ED NOTES
Rests with eyes closed and resps even and unlabored. Sitter remains at the bedside.      Parish Christie RN  10/31/23 5411

## 2023-10-31 NOTE — ED TRIAGE NOTES
Stopped taking his meds about 4 months ago and started drinking again about that time. Sitter at the bedside.

## 2023-10-31 NOTE — ED NOTES
Assisted to BR with assist. Gait slow and slightly unsteady.      Marco Antonio Rodrigez RN  10/31/23 1640

## 2023-10-31 NOTE — ED NOTES
RN spoke with Kanika Garnica (Registration) who states that insurance verification is complete for patient and he has [facility]/Medicaid     Cooper Sorensen, DEANNA  10/31/23 1945

## 2023-10-31 NOTE — VIRTUAL HEALTH
Lauren Cameron  [id number]  10/31/2023  10/31/23      ID: Patient is a 66 y.o. male    CC: I have been talking with GOD    HPI:  Pt is a 67 yo  male who presents for exacerbation of Schizoaffective bipolar type. Pt noted recent exacarbation of psychosis with bizarre delusions and non medication compliant. Pt continues to display manica and psychosis with confusion  Pt was in agreement with treatment team.        Pt noted he is doing \"not too good today. \"  Pt noted he is \" I dont need sleep. \"  Pt noted his apptetite is \"good\"  Pt rated his depresssion a \"2,\" on a scale of zero to ten with ten being the worst and zero being none. Pt rated his anxiety a \"1,\" on the same scale. Pt denied any thoughts to harm himself or anyone else. Pt noted both auditory and visiual hallucintations with bizarre delusions. Pt denied any hx of seizures, TBIs, Hep C or HIV  No TD noted, AIMS=0,     Pt noted hx of previous inpt psychiatric admissions  Pt denied any previous suicide attempts  Pt noted his cousin completed suicide  Pt noted family mental health hx of depression    Pt denied any hx of abuse trauma or neglect, physical sexual or emotional.      Alcohol: 12- 24 beers per day  Street drugs: marijuana daily  Tobacco: 1 ppd  Caffeine: 2-3 per day      Past Psychiatric History:   See note above     Family Psychiatric History:   History reviewed. No pertinent family history. Allergies:   Allergies   Allergen Reactions    Pcn [Penicillins]         OBJECTIVE  Vital Signs:  Vitals:    10/31/23 1211   BP: (!) 160/102   Pulse:    Resp:    Temp:    SpO2: 97%       Labs:  Recent Results (from the past 48 hour(s))   Urinalysis    Collection Time: 10/31/23 12:30 PM   Result Value Ref Range    Color, UA YELLOW YELLOW    Clarity, UA CLEAR CLEAR    Glucose, Urine NEGATIVE NEGATIVE MG/DL    Bilirubin Urine NEGATIVE NEGATIVE MG/DL    Ketones, Urine 15 (A) NEGATIVE MG/DL    Specific Gravity, UA 1.010 1.001 - 1.035

## 2023-10-31 NOTE — ED PROVIDER NOTES
Triage Chief Complaint:   Mental Health Problem (Pt denies being suicidal or homicidal. Is bipolar and feels like he is in a manic phase. Has been reported that he was in other people's everette but officer's were never able to locate him lastnight. This morning his son called him and was concerned. Officer's picked him up this morning and he has been having trouble keeping his thoughts together.)    [place]:  Venkata Marrero is a 66 y.o. male that presents patient presents to the ED after patient was pink slipped. He initially came to the ED early this morning and then he eloped. He was pink slipped this time because of some abnormal behavior thoughts paranoid issues. He supposed to be on Depakote and Elena Douse has not been taking it for 4 months. Patient does admit to drinking alcohol regular basis        Past Medical History:   Diagnosis Date    COPD (chronic obstructive pulmonary disease) (HCC)     ETOH abuse      Past Surgical History:   Procedure Laterality Date    EYE SURGERY      states as child, does not know what kind, just that he was matt 5    HYDROCELE EXCISION      as child, does not remember what age     History reviewed. No pertinent family history.   Social History     Socioeconomic History    Marital status: Single     Spouse name: Not on file    Number of children: Not on file    Years of education: Not on file    Highest education level: Not on file   Occupational History    Not on file   Tobacco Use    Smoking status: Every Day     Packs/day: 1     Types: Cigarettes    Smokeless tobacco: Not on file   Substance and Sexual Activity    Alcohol use: Yes     Comment: daily    Drug use: No     Comment: positive result for maria ma opn 2/16/16     Sexual activity: Not on file   Other Topics Concern    Not on file   Social History Narrative    Not on file     Social Determinants of Health     Financial Resource Strain: Not on file   Food Insecurity: Not on file   Transportation Needs: Not on file

## 2023-10-31 NOTE — ED NOTES
Continues to sleep with no distress noted. Sitter remains at the bedside.      Olga Foster RN  10/31/23 3640

## 2023-10-31 NOTE — ED NOTES
Rests with eyes closed and resps even and unlabored. Sitter at bedside.      Kleber Julian RN  10/31/23 1925

## 2023-11-01 NOTE — ED NOTES
Awake. Walked to the BR to void. Gait steady this time. Asking about going to [facility].      Omelia Koyanagi, RN  11/01/23 0138

## 2023-11-01 NOTE — ED NOTES
RN has made multiple attempts to call report on patient on the number given [phone]. There is no answer at this number the same as earlier, and immediately sends call to an unnamed voicemail box.      Rashel Soria RN  11/01/23 3171

## 2023-11-01 NOTE — ED NOTES
Fax report states No Answer. RN spoke with [address] and they said to fax it to them and they would attempt to fax to [facility]. RN faxed [place] to [address]      Floyd Lenz RN  10/31/23 0448

## 2023-11-01 NOTE — ED NOTES
RN called Son - Hayden Chino to inform him that patient is going to [facility] in approximately one hour     Rafael Wiggins RN  10/31/23 1550

## 2023-11-01 NOTE — ED NOTES
After being told that pink slip had still not gone through by Daniel Quijano, RN checked fax and found report saying number was busy. RN then attempted to call [facility] (to check fax number and inform them that it was \"ringing busy\") on 3 different numbers and was immediately directed to voice mail each time.       Vicki Felty, RN  10/31/23 4936

## 2023-11-17 ENCOUNTER — HOSPITAL ENCOUNTER (EMERGENCY)
Age: 63
Discharge: PSYCHIATRIC OR SUBSTANCE USE FACILITY | End: 2023-11-18
Attending: STUDENT IN AN ORGANIZED HEALTH CARE EDUCATION/TRAINING PROGRAM
Payer: MEDICAID

## 2023-11-17 VITALS
BODY MASS INDEX: 19.88 KG/M2 | TEMPERATURE: 98.3 F | HEIGHT: 71 IN | OXYGEN SATURATION: 96 % | DIASTOLIC BLOOD PRESSURE: 88 MMHG | RESPIRATION RATE: 18 BRPM | SYSTOLIC BLOOD PRESSURE: 145 MMHG | WEIGHT: 142 LBS | HEART RATE: 98 BPM

## 2023-11-17 DIAGNOSIS — F22 DELUSIONS (HCC): Primary | ICD-10-CM

## 2023-11-17 LAB
ACETAMINOPHEN LEVEL: <5 UG/ML (ref 15–30)
ALCOHOL SCREEN SERUM: <0.01 %WT/VOL
AMPHETAMINES: NEGATIVE
ANION GAP SERPL CALCULATED.3IONS-SCNC: 11 MMOL/L (ref 4–16)
BARBITURATE SCREEN URINE: NEGATIVE
BASOPHILS ABSOLUTE: 0.1 K/CU MM
BASOPHILS RELATIVE PERCENT: 0.7 % (ref 0–1)
BENZODIAZEPINE SCREEN, URINE: NEGATIVE
BUN SERPL-MCNC: 8 MG/DL (ref 6–23)
CALCIUM SERPL-MCNC: 9.2 MG/DL (ref 8.3–10.6)
CANNABINOID SCREEN URINE: ABNORMAL
CHLORIDE BLD-SCNC: 105 MMOL/L (ref 99–110)
CO2: 27 MMOL/L (ref 21–32)
COCAINE METABOLITE: NEGATIVE
CREAT SERPL-MCNC: 0.4 MG/DL (ref 0.9–1.3)
DIFFERENTIAL TYPE: ABNORMAL
DOSE AMOUNT: ABNORMAL
DOSE AMOUNT: ABNORMAL
DOSE TIME: ABNORMAL
DOSE TIME: ABNORMAL
EOSINOPHILS ABSOLUTE: 0.1 K/CU MM
EOSINOPHILS RELATIVE PERCENT: 1.6 % (ref 0–3)
FENTANYL URINE: NEGATIVE
GFR SERPL CREATININE-BSD FRML MDRD: >60 ML/MIN/1.73M2
GLUCOSE SERPL-MCNC: 89 MG/DL (ref 70–99)
HCT VFR BLD CALC: 42.1 % (ref 42–52)
HEMOGLOBIN: 14.3 GM/DL (ref 13.5–18)
IMMATURE NEUTROPHIL %: 0.2 % (ref 0–0.43)
LYMPHOCYTES ABSOLUTE: 4.2 K/CU MM
LYMPHOCYTES RELATIVE PERCENT: 46.5 % (ref 24–44)
MCH RBC QN AUTO: 32.9 PG (ref 27–31)
MCHC RBC AUTO-ENTMCNC: 34 % (ref 32–36)
MCV RBC AUTO: 96.8 FL (ref 78–100)
MONOCYTES ABSOLUTE: 0.6 K/CU MM
MONOCYTES RELATIVE PERCENT: 6.3 % (ref 0–4)
OPIATES, URINE: NEGATIVE
OXYCODONE: NEGATIVE
PDW BLD-RTO: 11.9 % (ref 11.7–14.9)
PLATELET # BLD: 298 K/CU MM (ref 140–440)
PMV BLD AUTO: 10.1 FL (ref 7.5–11.1)
POTASSIUM SERPL-SCNC: 4.1 MMOL/L (ref 3.5–5.1)
RBC # BLD: 4.35 M/CU MM (ref 4.6–6.2)
SALICYLATE LEVEL: <0.3 MG/DL (ref 15–30)
SARS-COV-2 RDRP RESP QL NAA+PROBE: NOT DETECTED
SEGMENTED NEUTROPHILS ABSOLUTE COUNT: 4 K/CU MM
SEGMENTED NEUTROPHILS RELATIVE PERCENT: 44.7 % (ref 36–66)
SODIUM BLD-SCNC: 143 MMOL/L (ref 135–145)
SOURCE: NORMAL
TOTAL IMMATURE NEUTOROPHIL: 0.02 K/CU MM
WBC # BLD: 9 K/CU MM (ref 4–10.5)

## 2023-11-17 PROCEDURE — 80048 BASIC METABOLIC PNL TOTAL CA: CPT

## 2023-11-17 PROCEDURE — 87635 SARS-COV-2 COVID-19 AMP PRB: CPT

## 2023-11-17 PROCEDURE — G0480 DRUG TEST DEF 1-7 CLASSES: HCPCS

## 2023-11-17 PROCEDURE — 80307 DRUG TEST PRSMV CHEM ANLYZR: CPT

## 2023-11-17 PROCEDURE — 99285 EMERGENCY DEPT VISIT HI MDM: CPT

## 2023-11-17 PROCEDURE — 85025 COMPLETE CBC W/AUTO DIFF WBC: CPT

## 2023-11-17 PROCEDURE — 90792 PSYCH DIAG EVAL W/MED SRVCS: CPT | Performed by: NURSE PRACTITIONER

## 2023-11-17 RX ORDER — QUETIAPINE 150 MG/1
300 TABLET, FILM COATED, EXTENDED RELEASE ORAL DAILY
Status: DISCONTINUED | OUTPATIENT
Start: 2023-11-17 | End: 2023-11-17 | Stop reason: RX

## 2023-11-17 RX ORDER — RISPERIDONE 1 MG/1
2 TABLET ORAL NIGHTLY
Status: DISCONTINUED | OUTPATIENT
Start: 2023-11-17 | End: 2023-11-18 | Stop reason: HOSPADM

## 2023-11-17 RX ORDER — DIVALPROEX SODIUM 500 MG/1
1000 TABLET, EXTENDED RELEASE ORAL NIGHTLY
Status: DISCONTINUED | OUTPATIENT
Start: 2023-11-17 | End: 2023-11-18 | Stop reason: HOSPADM

## 2023-11-17 RX ORDER — QUETIAPINE FUMARATE 300 MG/1
TABLET, FILM COATED ORAL
COMMUNITY
Start: 2023-11-16

## 2023-11-17 ASSESSMENT — ENCOUNTER SYMPTOMS
SHORTNESS OF BREATH: 0
VOMITING: 0
COUGH: 0
NAUSEA: 0
SORE THROAT: 0
ABDOMINAL PAIN: 0

## 2023-11-17 ASSESSMENT — PAIN - FUNCTIONAL ASSESSMENT: PAIN_FUNCTIONAL_ASSESSMENT: 0-10

## 2023-11-17 ASSESSMENT — PAIN SCALES - GENERAL: PAINLEVEL_OUTOF10: 0

## 2023-11-17 NOTE — ED NOTES
Pt brought by [facility] to ER for mental health evaluation. [facility] was called to his residence because his dead dog has chantelle laying on his couch for a few days. Pt states \" I was just hanging at my house listening to music and waiting for my dog to wake up\". Pt is alert and oriented upon arrival. Pt verbalized what he's been doing for the last week. Pt knew he was recently admitted inpatient at [facility] and pt has been taking his medication besides missing 1 dose of his medicine last night because he fell asleep before taking it. Pt denies homicidal and suicidal ideation. Pt is calm and cooperative during triage.       Rea Scott RN  11/17/23 7037

## 2023-11-17 NOTE — VIRTUAL HEALTH
[facility] Crisis Assessment       Chief Complaint: \" I'm doing fine\"    Initial Diagnosis: schizoaffective disorder    Voluntary/Involuntary Status: Involuntary     Guardian/POA: n/a    C-SSRS Risk (High, Moderate, Low): Low, but family is concerned that he is at risk    Medical/MH/Substance Use Treatment: [facility] inpatient. Luz Maria rao psychiatrist [address], о[place]га next week. Substance Use: Denied     Trauma/Abuse: Denied      Violence: Denied     Legal: Denied     Access to Weapons: Denied     Risk Factors: Lives alone    Protective Factors: Family    Support system: Mother, son mansoor     Brief Summary: Patient is a 79-year-old male. Patient was brought into the ED after the  was called for a wellness check by the sister due to a dog that was lying on the couch that the sister believed the dog is dead. Police came to the patient home and confirmed that the Dog was dead, patient reported that the dog was hit by a car 48 hours ago and the patient was waiting for the Dog to wake up. Per chart review patient has a history of bipolar disorder and schizoaffective disorder. Patient reported that he believes that the Dog was put to sleep by his neighbor, and he believes that the dog is still alive despite the police report of the dog no longer being alive. He denies any  hallucinations. He denies feeling  suicidal or homicidal ideation. Writer received patients permission to talk to son, sister, and mother. Sister Arsh Greer [phone] reported that the patient had the dog wrapped up in a blanket, and was convinced the dog was alive. Sister reported that the friends contacted her concerned about the patient. Son Ken Old [phone] reported that that the patient has been seeing things that aren't there and gets into dangerous situation, and has been harassing people, putting himself in harms way.  Son reported that he does not believe that

## 2023-11-17 NOTE — ED PROVIDER NOTES
limits. Chuy state that his house is well organized, and he was cooking dinner for himself when they arrived. It appears that he is able to manage himself at home. I am not sure if his delusion alone is enough to keep him on pink slip and admit. Patient states that he just got a phone call today for follow-up visit for psychiatry. He seems organized at this time. We will plan for psychiatric evaluation, if concern for need of psychiatric admission we will plan for BHI labs. Please see ED course below, psychiatry actually recommended admission. BHI labs were ordered and reviewed by me me and unremarkable. On reevaluation patient is quite angry that he is going to be admitted for psychiatric work-up. He states that he has a follow-up to psychiatry early next week. Currently awaiting placement. Signed out while awaiting placement. Amount and/or Complexity of Data Reviewed  Labs: ordered. Risk  Prescription drug management. ED Course as of 11/17/23 2015 Fri Nov 17, 2023   5983 Discuss further with psychiatry inquiring about the reason for admission. Collateral from the son reports that he has been more paranoid and has been getting into altercations with strangers. Son does not believe he is taking his medications. [CS]      ED Course User Index  [CS] Mauricio Ceja,        CONSULTS:  IP CONSULT TO [address] [place] [address] (SOCIAL WORK ONLY)  IP CONSULT TO [address] [place] [address] PROVIDER    CRITICAL CARE:  Total critical care time today provided was at least 0 minutes hat required close evaluation and/or intervention with concern for patient decompensation, discussion with patient and family about goals of care in the setting of a critical scenario, discussion with consulting services. This excludes seperately billable procedures and teaching. FINAL IMPRESSION      1.  Delusions [facility])          DISPOSITION / Marshall Hoyos Hold 11/17/2023 05:34:15 PM      PATIENT REFERRED TO:  No follow-up provider specified.     DISCHARGE MEDICATIONS:  New Prescriptions    No medications on file       Matt Carrasco DO  Emergency Medicine    (Please note that portions of this note were completed with a voice recognition program.  Efforts were made to edit the dictations but occasionally words are mis-transcribed.)       Matt Carrasco DO  Resident  11/17/23 2016

## 2023-11-17 NOTE — VIRTUAL HEALTH
Lorene Person, was evaluated through a synchronous (real-time) audio-video encounter. The patient (and/or guardian if applicable) is aware that this is a billable service, which includes applicable co-pays. This virtual visit was conducted with patient's (and/or legal guardian's) consent. Patient identification was verified, and a caregiver was present when appropriate. The patient was located at Facility ([facility] Department): [address] [place], [place] [address] [place]: [phone]  The provider was located at Home (City/State): [facility], [address] [place] [address] to [facility] Provider  Consult performed by: LISA Rinaldi - CNP  Consult ordered by: Erica Larose DO      EMERGENCY ROOM [address] [facility]    Date of Service: 11/17/2023    Purpose:    [address] - 1 hour psychiatric diagnostic interview     Chief Complaint:  \"The police asked me to come here. \"    History of Present Illness:  Lorene Person is a 66 y.o. male h/o schizoaffective BIB by [facility] for delusional thoughts, out of touch with reality. Pt believes his dog \"was nipped by a car\" and his neighbor did something to help the dog sleep. States his dog is going to be \"just fine once he wakes up\", states the dog is currently on the couch sleeping. Apparently the dog has been dead for the past 48 hours and police confirmed that the dog was dead. Pt denies HI/SI, denies AVH, denies delusions/paranoia. States he stopped taking his meds approx 6 months ago because they were affecting his memory. States he used to take seroquel, depakote but was unsure; kept saying \"you have access to my records\". Also states he used to take [place], which he called \"the devil's juice\". States he has an outpatient psychiatrist but hasn't seen her in 4 months.     Per collateral from son gathered by SW (see SW note), \"patient has been seeing things that aren't there and gets into

## 2023-11-18 NOTE — VIRTUAL HEALTH
Spoke with ED staff who is requesting to re-evaluate patient. less than 24hrs. This writer will escalate to NP on call       Lalit Barrera, was evaluated through a synchronous (real-time) audio-video encounter. The patient (and/or guardian if applicable) is aware that this is a billable service, which includes applicable co-pays. This virtual visit was conducted with patient's (and/or legal guardian's) consent. Patient identification was verified, and a caregiver was present when appropriate. The patient was located at [facility] [address] [place] [address] [place]: [phone]  The provider was located at Home (City/State): [address] to [employer]  Consult performed by: Angelo Lopez LCSW  Consult ordered by: Parrish French MD         Total time spent on this encounter: Not billed by time    --Angelo Lopez LCSW on 11/17/2023 at 11:33 PM    An electronic signature was used to authenticate this note.

## 2023-11-18 NOTE — ED PROVIDER NOTES
Dr. Sara Plummer documentation:     I took signout on the patient at the shift change pending placement per psychiatry midlevel recommendation. In brief, the patient had a welfare check called on him. Patient does not have any suicidal ideation. Patient does not have any homicidal ideation. Patient denied any auditory or visual hallucinations to me. He does seems to responding to internal stimuli. Denied paranoia. He would like to go home. I am told that his apartment/home was tidy and that he was cooking dinner when the police came to his home. He did have a dead dog on the couch and seems to be having difficulty coping with/believing the idea of the dog dying. We had a rather long discussion about \"what if the dog was dead\" and he reported to me that he would be pretty upset but would not want to hurt himself. I read through the psychiatry midlevel note - it looks like the son is concerned the patient hasn't been taking his medications and has been getting into some arguments with neighbors/etc. The psychiatry midlevel decided to place the patient to get him back on his medications. I do not think the patient requires placement. I do not think the patient is a danger to himself or others at this time and seems to be having more of an adjustment issue/grief reaction to the dog dying. Unfortunately, the patient was pink slipped/certified by police and per [facility], I cannot overturn the pink slip. I attempted to reach the midlevel who spoke with the patient for hours. The consult was reordered so we could speak with her. Since about 730pm, I have been trying to get in contact with the psych midlevel and have been unable to. It is now 1258am and I have yet to receive a call back. The patient was accepted at [facility] and was transferred stable.       Nate Vo MD  11/18/23 2432

## 2023-11-18 NOTE — ED NOTES
Attempted to call report for this patient x 2 phone rang several times no answer      Ailyn Cerda RN  11/18/23 9299

## 2024-01-16 ENCOUNTER — PARAMEDICINE (OUTPATIENT)
Dept: OTHER | Age: 64
End: 2024-01-16

## 2024-01-16 NOTE — PROGRESS NOTES
Patient was referred to the CP program by APS. APS states patient is alcoholic and has some mental health issues. Patient was turned in to APS by his mother who is concerned for his safety. APS would like to make a visit and requested CP go as well. Visit is set for Wednesday 1/17/24 at 1200

## 2024-01-29 ENCOUNTER — TELEPHONE (OUTPATIENT)
Dept: OTHER | Age: 64
End: 2024-01-29

## 2024-01-29 ENCOUNTER — PARAMEDICINE (OUTPATIENT)
Dept: OTHER | Age: 64
End: 2024-01-29

## 2024-01-29 ENCOUNTER — HOSPITAL ENCOUNTER (EMERGENCY)
Age: 64
Discharge: PSYCHIATRIC OR SUBSTANCE USE FACILITY | End: 2024-01-29
Attending: EMERGENCY MEDICINE
Payer: MEDICAID

## 2024-01-29 VITALS
RESPIRATION RATE: 18 BRPM | HEART RATE: 82 BPM | BODY MASS INDEX: 19.8 KG/M2 | TEMPERATURE: 98.1 F | WEIGHT: 142 LBS | OXYGEN SATURATION: 100 % | DIASTOLIC BLOOD PRESSURE: 84 MMHG | SYSTOLIC BLOOD PRESSURE: 132 MMHG

## 2024-01-29 DIAGNOSIS — F29 PSYCHOSIS, UNSPECIFIED PSYCHOSIS TYPE (HCC): Primary | ICD-10-CM

## 2024-01-29 LAB
ALBUMIN SERPL-MCNC: 4.1 GM/DL (ref 3.4–5)
ALCOHOL SCREEN SERUM: <0.01 %WT/VOL
ALP BLD-CCNC: 81 IU/L (ref 40–129)
ALT SERPL-CCNC: 9 U/L (ref 10–40)
AMPHETAMINES: NEGATIVE
ANION GAP SERPL CALCULATED.3IONS-SCNC: 11 MMOL/L (ref 7–16)
AST SERPL-CCNC: 13 IU/L (ref 15–37)
BARBITURATE SCREEN URINE: NEGATIVE
BASOPHILS ABSOLUTE: 0.1 K/CU MM
BASOPHILS RELATIVE PERCENT: 0.5 % (ref 0–1)
BENZODIAZEPINE SCREEN, URINE: NEGATIVE
BILIRUB SERPL-MCNC: 0.3 MG/DL (ref 0–1)
BILIRUBIN URINE: NEGATIVE MG/DL
BLOOD, URINE: NEGATIVE
BUN SERPL-MCNC: 15 MG/DL (ref 6–23)
CALCIUM SERPL-MCNC: 9.1 MG/DL (ref 8.3–10.6)
CANNABINOID SCREEN URINE: ABNORMAL
CHLORIDE BLD-SCNC: 107 MMOL/L (ref 99–110)
CLARITY: CLEAR
CO2: 25 MMOL/L (ref 21–32)
COCAINE METABOLITE: NEGATIVE
COLOR: YELLOW
COMMENT UA: NORMAL
CREAT SERPL-MCNC: 0.4 MG/DL (ref 0.9–1.3)
DIFFERENTIAL TYPE: ABNORMAL
EOSINOPHILS ABSOLUTE: 0.1 K/CU MM
EOSINOPHILS RELATIVE PERCENT: 0.8 % (ref 0–3)
FENTANYL URINE: NEGATIVE
GFR SERPL CREATININE-BSD FRML MDRD: >60 ML/MIN/1.73M2
GLUCOSE SERPL-MCNC: 94 MG/DL (ref 70–99)
GLUCOSE, URINE: NEGATIVE MG/DL
HCT VFR BLD CALC: 43.1 % (ref 42–52)
HEMOGLOBIN: 14.7 GM/DL (ref 13.5–18)
IMMATURE NEUTROPHIL %: 0.3 % (ref 0–0.43)
KETONES, URINE: NEGATIVE MG/DL
LEUKOCYTE ESTERASE, URINE: NEGATIVE
LYMPHOCYTES ABSOLUTE: 3 K/CU MM
LYMPHOCYTES RELATIVE PERCENT: 25.6 % (ref 24–44)
MAGNESIUM: 2 MG/DL (ref 1.8–2.4)
MCH RBC QN AUTO: 33.1 PG (ref 27–31)
MCHC RBC AUTO-ENTMCNC: 34.1 % (ref 32–36)
MCV RBC AUTO: 97.1 FL (ref 78–100)
MONOCYTES ABSOLUTE: 0.5 K/CU MM
MONOCYTES RELATIVE PERCENT: 4.3 % (ref 0–4)
NITRITE URINE, QUANTITATIVE: NEGATIVE
OPIATES, URINE: NEGATIVE
OXYCODONE: NEGATIVE
PDW BLD-RTO: 12.4 % (ref 11.7–14.9)
PH, URINE: 7 (ref 5–8)
PLATELET # BLD: 272 K/CU MM (ref 140–440)
PMV BLD AUTO: 10 FL (ref 7.5–11.1)
POTASSIUM SERPL-SCNC: 3.7 MMOL/L (ref 3.5–5.1)
PROTEIN UA: NEGATIVE MG/DL
RBC # BLD: 4.44 M/CU MM (ref 4.6–6.2)
SEGMENTED NEUTROPHILS ABSOLUTE COUNT: 8 K/CU MM
SEGMENTED NEUTROPHILS RELATIVE PERCENT: 68.5 % (ref 36–66)
SODIUM BLD-SCNC: 143 MMOL/L (ref 135–145)
SPECIFIC GRAVITY UA: 1.01 (ref 1–1.03)
TOTAL IMMATURE NEUTOROPHIL: 0.04 K/CU MM
TOTAL PROTEIN: 6.3 GM/DL (ref 6.4–8.2)
TSH SERPL DL<=0.005 MIU/L-ACNC: 0.87 UIU/ML (ref 0.27–4.2)
UROBILINOGEN, URINE: 0.2 MG/DL (ref 0.2–1)
WBC # BLD: 11.6 K/CU MM (ref 4–10.5)

## 2024-01-29 PROCEDURE — 81003 URINALYSIS AUTO W/O SCOPE: CPT

## 2024-01-29 PROCEDURE — 99285 EMERGENCY DEPT VISIT HI MDM: CPT

## 2024-01-29 PROCEDURE — 84443 ASSAY THYROID STIM HORMONE: CPT

## 2024-01-29 PROCEDURE — 83735 ASSAY OF MAGNESIUM: CPT

## 2024-01-29 PROCEDURE — 85025 COMPLETE CBC W/AUTO DIFF WBC: CPT

## 2024-01-29 PROCEDURE — G0480 DRUG TEST DEF 1-7 CLASSES: HCPCS

## 2024-01-29 PROCEDURE — 90792 PSYCH DIAG EVAL W/MED SRVCS: CPT

## 2024-01-29 PROCEDURE — 80307 DRUG TEST PRSMV CHEM ANLYZR: CPT

## 2024-01-29 PROCEDURE — 80053 COMPREHEN METABOLIC PANEL: CPT

## 2024-01-29 RX ORDER — THIAMINE HYDROCHLORIDE 100 MG/ML
200 INJECTION, SOLUTION INTRAMUSCULAR; INTRAVENOUS ONCE
Status: DISCONTINUED | OUTPATIENT
Start: 2024-01-29 | End: 2024-01-29 | Stop reason: HOSPADM

## 2024-01-29 NOTE — PROGRESS NOTES
Have been working with patient's sister Rianna. She would like to have guardianship. She has contacted an  to start the process. Also have been working with patients  at [facility]. Family needs a capacity assessment. [facility] stated [facility] would be able to do that however in most cases they will not. Patients CM at [facility] is going to reach out to [facility] to see if they will do the assessment.

## 2024-01-29 NOTE — ED PROVIDER NOTES
Triage Chief Complaint:   Psychiatric Evaluation (Pt sent in with [employer] )    [place]:  Jose Bedolla is a 63 y.o. male that presents to the ED by 3 local 's.  The patient has a past history of alcohol abuse and mental health illness-manic behavior.  He had numerous recent ED visits and hospitalizations.  Today his mother called because he had wound on the stove that was trying to burn.  Patient is disorganized in his thoughts.  He is disoriented that he can tell me his name I can tell me his age he is unsure the year the month day of the week.  Patient is not suicidal.        Past Medical History:   Diagnosis Date    COPD (chronic obstructive pulmonary disease) (HCC)     ETOH abuse      Past Surgical History:   Procedure Laterality Date    EYE SURGERY      states as child, does not know what kind, just that he was matt 5    HYDROCELE EXCISION      as child, does not remember what age     No family history on file.  Social History     Socioeconomic History    Marital status: Single     Spouse name: Not on file    Number of children: Not on file    Years of education: Not on file    Highest education level: Not on file   Occupational History    Not on file   Tobacco Use    Smoking status: Every Day     Current packs/day: 1.00     Types: Cigarettes    Smokeless tobacco: Not on file   Substance and Sexual Activity    Alcohol use: Yes     Comment: daily    Drug use: No     Comment: positive result for maijuana opn 2/16/16     Sexual activity: Not on file   Other Topics Concern    Not on file   Social History Narrative    Not on file     Social Determinants of Health     Financial Resource Strain: Not on file   Food Insecurity: Not on file   Transportation Needs: Not on file   Physical Activity: Not on file   Stress: Not on file   Social Connections: Not on file   Intimate Partner Violence: Not on file   Housing Stability: Not on file     Current Facility-Administered Medications   Medication Dose Route Frequency

## 2024-01-29 NOTE — VIRTUAL HEALTH
on 1/29/2024 at 11:23 AM    An electronic signature was used to authenticate this note.      END OF NOTE  -------------------------

## 2024-01-29 NOTE — TELEPHONE ENCOUNTER
Received a call from patients sister Rianna. She lives out of state and has come here to try and help Jose. This morning she called and stated her brother needs help and is unable to take care of himself. States that a couple months ago he was fine but has progressively gotten worse over the last couple of months. She stated her mother called APS however Rianna states they haven't heard from them and states \"they haven't been any help\". I advised Rianna that Ana from [facility] has been to his home 3 times in the last two weeks but no one would answer the door. Rianna states the patient hasn't had any medical care and does not have a PCP. I advised her that Jose had an appointment with the walk in clinic last Friday 1/26/24 at 3:00 but did not show. I also advised that the patient has an appointment already set up Dr. White on 3/11/24. Rianna would like to file for emergency guardianship. I advised that he will need an assessment done for capacity and would like someone to come to the home to make the assessment. I advised I was not aware of anyone that would come to the home to do an assessment. She said Jose won't leave the house. I suggested she call the facility he was just released from to see if they assessed for capacity while he was there. Rianna ended our call to answer a call from [facility].     Rianna called back and stated Jose was \"going crazy\". She would not elaborate on what going crazy meant. She stated she has called the  department to come pink slip him then ended the call abruptly.

## 2024-01-29 NOTE — ED NOTES
Spoke to ED provider, Ron Cowan DO, and discussed Telepsych consult recommendations via phone call at 1250.

## 2024-02-19 ENCOUNTER — TELEPHONE (OUTPATIENT)
Dept: OTHER | Age: 64
End: 2024-02-19

## 2024-02-19 NOTE — TELEPHONE ENCOUNTER
Received a call from Rianna today. She is now POA for Jose. Her  was unable to navigate through the guardian process. Rianna is now working with an  in [place]. Jose was released from [facility] last Friday. Jose is home however Rianna states the medications are not working. Jose is just as confused as he was when he went to [facility] three weeks ago. Also his moods are up and down. She is concerned for his safety staying at home alone. Patient has medicaid but no income therefor they have been unsuccessful in getting him placed in a long term care memory unit. Reached out to Cindy's office and was able to get his PCP appointment moved up from 3/11/24 to 2/23 at 8:30. Rianna is working with her  and will keep me updated.

## 2024-02-20 ENCOUNTER — HOSPITAL ENCOUNTER (EMERGENCY)
Age: 64
Discharge: PSYCHIATRIC OR SUBSTANCE USE FACILITY | End: 2024-02-21
Attending: EMERGENCY MEDICINE
Payer: MEDICAID

## 2024-02-20 ENCOUNTER — APPOINTMENT (OUTPATIENT)
Dept: CT IMAGING | Age: 64
End: 2024-02-20
Payer: MEDICAID

## 2024-02-20 DIAGNOSIS — F23 ACUTE PSYCHOSIS (HCC): ICD-10-CM

## 2024-02-20 DIAGNOSIS — R45.851 SUICIDE IDEATION: Primary | ICD-10-CM

## 2024-02-20 LAB
ACETAMINOPHEN LEVEL: <5 UG/ML (ref 15–30)
ALBUMIN SERPL-MCNC: 3.9 GM/DL (ref 3.4–5)
ALCOHOL SCREEN SERUM: <0.01 %WT/VOL
ALP BLD-CCNC: 100 IU/L (ref 40–129)
ALT SERPL-CCNC: 8 U/L (ref 10–40)
AMMONIA: 16 UMOL/L (ref 16–60)
AMPHETAMINES: NEGATIVE
ANION GAP SERPL CALCULATED.3IONS-SCNC: 10 MMOL/L (ref 7–16)
AST SERPL-CCNC: 9 IU/L (ref 15–37)
BARBITURATE SCREEN URINE: NEGATIVE
BASOPHILS ABSOLUTE: 0.1 K/CU MM
BASOPHILS RELATIVE PERCENT: 0.5 % (ref 0–1)
BENZODIAZEPINE SCREEN, URINE: NEGATIVE
BILIRUB SERPL-MCNC: 0.2 MG/DL (ref 0–1)
BILIRUBIN URINE: NEGATIVE MG/DL
BLOOD, URINE: NEGATIVE
BUN SERPL-MCNC: 10 MG/DL (ref 6–23)
CALCIUM SERPL-MCNC: 8.4 MG/DL (ref 8.3–10.6)
CANNABINOID SCREEN URINE: ABNORMAL
CHLORIDE BLD-SCNC: 103 MMOL/L (ref 99–110)
CLARITY: CLEAR
CO2: 29 MMOL/L (ref 21–32)
COCAINE METABOLITE: NEGATIVE
COLOR: YELLOW
COMMENT UA: NORMAL
CREAT SERPL-MCNC: 0.4 MG/DL (ref 0.9–1.3)
DIFFERENTIAL TYPE: ABNORMAL
EOSINOPHILS ABSOLUTE: 0.2 K/CU MM
EOSINOPHILS RELATIVE PERCENT: 2 % (ref 0–3)
FENTANYL URINE: NEGATIVE
GFR SERPL CREATININE-BSD FRML MDRD: >60 ML/MIN/1.73M2
GLUCOSE SERPL-MCNC: 140 MG/DL (ref 70–99)
GLUCOSE, URINE: NEGATIVE MG/DL
HCT VFR BLD CALC: 38.3 % (ref 42–52)
HEMOGLOBIN: 13 GM/DL (ref 13.5–18)
IMMATURE NEUTROPHIL %: 0.2 % (ref 0–0.43)
KETONES, URINE: NEGATIVE MG/DL
LEUKOCYTE ESTERASE, URINE: NEGATIVE
LIPASE: 61 IU/L (ref 13–60)
LYMPHOCYTES ABSOLUTE: 3.9 K/CU MM
LYMPHOCYTES RELATIVE PERCENT: 40.8 % (ref 24–44)
MCH RBC QN AUTO: 33 PG (ref 27–31)
MCHC RBC AUTO-ENTMCNC: 33.9 % (ref 32–36)
MCV RBC AUTO: 97.2 FL (ref 78–100)
MONOCYTES ABSOLUTE: 0.5 K/CU MM
MONOCYTES RELATIVE PERCENT: 5.4 % (ref 0–4)
NITRITE URINE, QUANTITATIVE: NEGATIVE
OPIATES, URINE: NEGATIVE
OXYCODONE: NEGATIVE
PDW BLD-RTO: 11.9 % (ref 11.7–14.9)
PH, URINE: 6 (ref 5–8)
PLATELET # BLD: 245 K/CU MM (ref 140–440)
PMV BLD AUTO: 10 FL (ref 7.5–11.1)
POTASSIUM SERPL-SCNC: 3.5 MMOL/L (ref 3.5–5.1)
PROTEIN UA: NEGATIVE MG/DL
RBC # BLD: 3.94 M/CU MM (ref 4.6–6.2)
SALICYLATE LEVEL: <0.3 MG/DL (ref 15–30)
SARS-COV-2 RDRP RESP QL NAA+PROBE: NOT DETECTED
SEGMENTED NEUTROPHILS ABSOLUTE COUNT: 4.9 K/CU MM
SEGMENTED NEUTROPHILS RELATIVE PERCENT: 51.1 % (ref 36–66)
SODIUM BLD-SCNC: 142 MMOL/L (ref 135–145)
SOURCE: NORMAL
SPECIFIC GRAVITY UA: <1.005 (ref 1–1.03)
TOTAL IMMATURE NEUTOROPHIL: 0.02 K/CU MM
TOTAL PROTEIN: 6.3 GM/DL (ref 6.4–8.2)
UROBILINOGEN, URINE: 0.2 MG/DL (ref 0.2–1)
WBC # BLD: 9.5 K/CU MM (ref 4–10.5)

## 2024-02-20 PROCEDURE — 80053 COMPREHEN METABOLIC PANEL: CPT

## 2024-02-20 PROCEDURE — 96372 THER/PROPH/DIAG INJ SC/IM: CPT

## 2024-02-20 PROCEDURE — G0480 DRUG TEST DEF 1-7 CLASSES: HCPCS

## 2024-02-20 PROCEDURE — 82140 ASSAY OF AMMONIA: CPT

## 2024-02-20 PROCEDURE — 70450 CT HEAD/BRAIN W/O DYE: CPT

## 2024-02-20 PROCEDURE — 80307 DRUG TEST PRSMV CHEM ANLYZR: CPT

## 2024-02-20 PROCEDURE — 80165 DIPROPYLACETIC ACID FREE: CPT

## 2024-02-20 PROCEDURE — 81003 URINALYSIS AUTO W/O SCOPE: CPT

## 2024-02-20 PROCEDURE — 85025 COMPLETE CBC W/AUTO DIFF WBC: CPT

## 2024-02-20 PROCEDURE — 87635 SARS-COV-2 COVID-19 AMP PRB: CPT

## 2024-02-20 PROCEDURE — 83690 ASSAY OF LIPASE: CPT

## 2024-02-20 PROCEDURE — 99285 EMERGENCY DEPT VISIT HI MDM: CPT

## 2024-02-20 PROCEDURE — 80164 ASSAY DIPROPYLACETIC ACD TOT: CPT

## 2024-02-20 RX ORDER — VITAMIN B COMPLEX
1 CAPSULE ORAL DAILY
COMMUNITY

## 2024-02-20 RX ORDER — DIVALPROEX SODIUM 250 MG/1
250 TABLET, DELAYED RELEASE ORAL 3 TIMES DAILY
COMMUNITY

## 2024-02-20 ASSESSMENT — PAIN - FUNCTIONAL ASSESSMENT: PAIN_FUNCTIONAL_ASSESSMENT: NONE - DENIES PAIN

## 2024-02-20 ASSESSMENT — ENCOUNTER SYMPTOMS
RESPIRATORY NEGATIVE: 1
EYES NEGATIVE: 1
GASTROINTESTINAL NEGATIVE: 1

## 2024-02-21 VITALS
WEIGHT: 141.8 LBS | DIASTOLIC BLOOD PRESSURE: 68 MMHG | BODY MASS INDEX: 18.79 KG/M2 | HEIGHT: 73 IN | SYSTOLIC BLOOD PRESSURE: 114 MMHG | OXYGEN SATURATION: 97 % | HEART RATE: 97 BPM | TEMPERATURE: 98.1 F | RESPIRATION RATE: 16 BRPM

## 2024-02-21 PROCEDURE — 90791 PSYCH DIAGNOSTIC EVALUATION: CPT | Performed by: SOCIAL WORKER

## 2024-02-21 PROCEDURE — 6370000000 HC RX 637 (ALT 250 FOR IP): Performed by: EMERGENCY MEDICINE

## 2024-02-21 PROCEDURE — 2580000003 HC RX 258

## 2024-02-21 PROCEDURE — 6360000002 HC RX W HCPCS: Performed by: EMERGENCY MEDICINE

## 2024-02-21 RX ORDER — LORAZEPAM 1 MG/1
2 TABLET ORAL ONCE
Status: COMPLETED | OUTPATIENT
Start: 2024-02-21 | End: 2024-02-21

## 2024-02-21 RX ORDER — WATER 10 ML/10ML
INJECTION INTRAMUSCULAR; INTRAVENOUS; SUBCUTANEOUS
Status: COMPLETED
Start: 2024-02-21 | End: 2024-02-21

## 2024-02-21 RX ORDER — ZIPRASIDONE MESYLATE 20 MG/ML
20 INJECTION, POWDER, LYOPHILIZED, FOR SOLUTION INTRAMUSCULAR ONCE
Status: COMPLETED | OUTPATIENT
Start: 2024-02-21 | End: 2024-02-21

## 2024-02-21 RX ADMIN — LORAZEPAM 2 MG: 1 TABLET ORAL at 00:20

## 2024-02-21 RX ADMIN — WATER: 1 INJECTION INTRAMUSCULAR; INTRAVENOUS; SUBCUTANEOUS at 00:27

## 2024-02-21 RX ADMIN — ZIPRASIDONE MESYLATE 20 MG: 20 INJECTION, POWDER, LYOPHILIZED, FOR SOLUTION INTRAMUSCULAR at 00:22

## 2024-02-21 NOTE — ED NOTES
Pt tried to leave and tried to push through the .  Pt returned to bed by asking him to do so because I wanted him to be safe then sister arrived back to room and was easily directed back to bed and to be cooperative for now.

## 2024-02-21 NOTE — ED NOTES
Pt's sister Rianna Bedolla  [phone] is leaving for the night but will be available by phone.  Pt is resting quietly with eyes closed.

## 2024-02-21 NOTE — VIRTUAL HEALTH
Reason for Cancel: TelePsych Reason for Cancel: Patient already admitted     LCSW spoke with DEANNA Chery regarding telepsych protocol for admission. [facility]. No further contact from Telepsych to the patient is needed at this time. New order will be cancelled.    --Anum Strauss LCSW on 2/21/2024 at 2:15 AM    An electronic signature was used to authenticate this note.    
in a hospital admission.  Prior to Admission medications    Medication Sig Start Date End Date Taking? Authorizing Provider   divalproex (DEPAKOTE) 250 MG DR tablet Take 1 tablet by mouth 3 times daily   Yes Carlee Garza MD   b complex vitamins capsule Take 1 capsule by mouth daily   Yes Carlee Garza MD   paliperidone palmitate ER (INVEGA SUSTENNA) 234 MG/1.5ML BRIAN IM injection Inject 234 mg into the muscle every 30 days 1st dose 2/9/24, 2nd dose 156mg 2/12/24, maintenance dose 234mg 3/15/24.   Yes Carlee Garza MD   QUEtiapine (SEROQUEL) 300 MG tablet  11/16/23   Carlee Garza MD   divalproex (DEPAKOTE ER) 500 MG extended release tablet Take 2 tablets by mouth nightly  Patient not taking: Reported on 2/20/2024 3/21/19   Flora Rosen APRN - CNP   traZODone (DESYREL) 50 MG tablet Take 1 tablet by mouth nightly as needed for Sleep  Patient not taking: Reported on 2/20/2024 3/21/19   Flora Rosen APRN - CNP   risperiDONE (RISPERDAL) 2 MG tablet Take 1 tablet by mouth nightly  Patient not taking: Reported on 2/20/2024 3/21/19   Flora Rosen APRN - CNP   folic acid (FOLVITE) 1 MG tablet Take 1 tablet by mouth daily 3/22/19   Flora Rosen APRN - CNP   nicotine (NICODERM CQ) 21 MG/24HR Place 1 patch onto the skin daily  Patient not taking: Reported on 2/20/2024 3/22/19   Flora Rosen APRN - CNP   famotidine (PEPCID) 20 MG tablet Take 1 tablet by mouth 2 times daily  Patient not taking: Reported on 2/20/2024 3/21/19   Flora Rosen APRN - CNP   vitamin B-1 100 MG tablet Take 1 tablet by mouth daily 3/22/19   Flora Rosen APRN - CNP   aspirin 81 MG tablet Take 81 mg by mouth daily  Patient not taking: Reported on 2/20/2024    Carlee Garza MD        Labs:  UDS: positive for marijuana   ETOH: negative  HCG: N/A      Mental Status Exam:  Level of consciousness:  confused  and awake   Appearance:  seated in bed.  Does appear stated age. No acute distress.  Behavior/Motor:

## 2024-02-21 NOTE — ED NOTES
Pt ambulated to BR with sitter and then willingly but with some hesitation ambulated to room 6.  Has been cooperative.  Family remains at bedside.  He is listening to music.  Chocolate pudding cup given upon request.

## 2024-02-21 NOTE — ED PROVIDER NOTES
Mental Health Problem  Patient reports to the emergency department with family members.  Family members have power of  and have brought the patient in because of altered mental status.  Patient's been having increasing confusion.  He has a known history of bipolar disease as well as schizophrenia.  Patient's also had multiple hospitalizations at psychiatric institutions where they have changed around his medications but it has not been doing any good.  Today the patient had went swimming in a pond the patient also has not been able to meet his activities of daily living they are afraid with him cooking or utilizing other products that he may have to be a harm to himself.  The patient has stated more than once that he would like to \"leave this existence\" the patient does live alone and he is not safe to be at home by himself.  The patient was taken to a nurse practitioner who is concerned that there is a medical problem with the patient or something that may be compounding his current diagnoses.  Family reports that he also has a history of some dementia.    Review of Systems   Constitutional: Negative.    HENT: Negative.     Eyes: Negative.    Respiratory: Negative.     Cardiovascular: Negative.    Gastrointestinal: Negative.    Genitourinary: Negative.    Musculoskeletal: Negative.    Skin: Negative.    Neurological: Negative.    All other systems reviewed and are negative.      History reviewed. No pertinent family history.  Social History     Socioeconomic History    Marital status: Single     Spouse name: Not on file    Number of children: Not on file    Years of education: Not on file    Highest education level: Not on file   Occupational History    Not on file   Tobacco Use    Smoking status: Every Day     Current packs/day: 1.00     Types: Cigarettes    Smokeless tobacco: Not on file   Vaping Use    Vaping Use: Never used   Substance and Sexual Activity    Alcohol use: Yes     Comment: daily, has

## 2024-02-21 NOTE — ED NOTES
Pt tried to leave but was easily directed advising Lubna wanted to talk with him.  AMIE kaiser returned to his room without incident and is cooperating with the interview.  Family remains supportive and contributing to the conversation.

## 2024-02-21 NOTE — ED NOTES
Pt is wanting to leave and discussing an alternative plan.  Family is suggesting to stay for awhile and trying to redirect him but he seems a bit anxious.

## 2024-02-21 NOTE — ED NOTES
Called [facility] for direction on plan of care.  Left a message.  ([phone])

## 2024-02-21 NOTE — ED NOTES
While security was documenting his belongings, they found a long thin pointy steak like knife in his coat pocket.  The knife was given to his sister who took it out to the car.

## 2024-02-21 NOTE — ED NOTES
Pt tried to leave again, attempting to push his way through the doorway.  Security is also with the sitter in the doorway. I asked him to have a seat because I was going to get some medications to help him relax.  He went and sat in the chair.

## 2024-02-21 NOTE — ED NOTES
Called sister Rianna.  She will return in a few minutes to fill paperwork (consents) for the [facility].  She will also bring POA paperwork.

## 2024-02-24 LAB
VALPROIC ACID % FREE: ABNORMAL % (ref 5–18)
VALPROIC ACID TOTAL: 28 UG/ML (ref 50–125)
VALPROIC ACID, FREE: <7 UG/ML (ref 7–23)

## 2024-05-31 ENCOUNTER — TELEPHONE (OUTPATIENT)
Dept: OTHER | Age: 64
End: 2024-05-31

## 2024-05-31 NOTE — TELEPHONE ENCOUNTER
According to patient's sister patient has been placed at [facility]. Right now the plan is for him to remain there. At this time I will remove him from the program. Should any needs arise another file will be opened.